# Patient Record
Sex: FEMALE | Race: WHITE
[De-identification: names, ages, dates, MRNs, and addresses within clinical notes are randomized per-mention and may not be internally consistent; named-entity substitution may affect disease eponyms.]

---

## 2020-01-22 ENCOUNTER — HOSPITAL ENCOUNTER (EMERGENCY)
Dept: HOSPITAL 41 - JD.ED | Age: 55
Discharge: HOME | End: 2020-01-22
Payer: COMMERCIAL

## 2020-01-22 DIAGNOSIS — Z88.0: ICD-10-CM

## 2020-01-22 DIAGNOSIS — M54.41: Primary | ICD-10-CM

## 2020-01-22 DIAGNOSIS — H81.10: ICD-10-CM

## 2020-01-22 PROCEDURE — 85025 COMPLETE CBC W/AUTO DIFF WBC: CPT

## 2020-01-22 PROCEDURE — 84443 ASSAY THYROID STIM HORMONE: CPT

## 2020-01-22 PROCEDURE — 99284 EMERGENCY DEPT VISIT MOD MDM: CPT

## 2020-01-22 PROCEDURE — 36415 COLL VENOUS BLD VENIPUNCTURE: CPT

## 2020-01-22 PROCEDURE — 80053 COMPREHEN METABOLIC PANEL: CPT

## 2020-01-22 PROCEDURE — 81001 URINALYSIS AUTO W/SCOPE: CPT

## 2020-01-22 PROCEDURE — 83735 ASSAY OF MAGNESIUM: CPT

## 2020-01-22 PROCEDURE — 96375 TX/PRO/DX INJ NEW DRUG ADDON: CPT

## 2020-01-22 PROCEDURE — 96374 THER/PROPH/DIAG INJ IV PUSH: CPT

## 2020-01-22 NOTE — EDM.PDOC
<Maye Pena - Last Filed: 01/22/20 09:23>





ED HPI GENERAL MEDICAL PROBLEM





- General


Chief Complaint: Neuro Symptoms/Deficits


Stated Complaint: BACK PAIN/ABD PAIN


Time Seen by Provider: 01/22/20 08:39


Source of Information: Reports: Patient, Family


History Limitations: Reports: No Limitations





- History of Present Illness


INITIAL COMMENTS - FREE TEXT/NARRATIVE: 


54-year-old female presents with dizziness and right sided sciatic pain. She 

states that the dizziness has been occurring several times a day since she 

moved to the area in September, however, it was the worst it has ever been last 

night and into this morning. She was only able to get 2 hours of sleep. She 

describes the dizziness as "room spinning." The patient is also experiencing 

tinnitus, but is unsure if it bilateral or one-sided. She denies hearing loss, 

but does have an extensive family history of hearing loss. The sciatic pain is 

chronic. She had a lumbar discectomy done in December a couple years back. 

After the surgery, the pain improved from going into her foot, to going into 

her knee. 





Duration: Chronic, Intermittent


Location: Reports: Head, Back


Quality: Reports: Ache (down the back of the right leg to the knee )


Severity: Moderate


Improves with: Reports: Other (movement helps the back pain)


Worsens with: Reports: Other (standing still worsens the back pain, patient is 

unsure if movement worsens dizziness)


Associated Symptoms: Reports: Other (tinnitus, dizziness, sciatic pain (right 

side)).  Denies: Headaches, Nausea/Vomiting, Syncope, Weakness


  ** Left Lower Back


Pain Score (Numeric/FACES): 6





- Related Data


 Allergies











Allergy/AdvReac Type Severity Reaction Status Date / Time


 


Penicillins Allergy  Hives Verified 01/22/20 08:32











Home Meds: 


 Home Meds





Meclizine [Antivert] 25 mg PO TID #21 tab.chew 01/22/20 [Rx]


buPROPion [Wellbutrin] 150 mg PO DAILY 01/22/20 [History]











Past Medical History


Musculoskeletal History: Reports: Other (See Below)


Other Musculoskeletal History: chronic sciatica


Neurological History: Reports: Vertigo





Social & Family History





- Tobacco Use


Smoking Status *Q: Never Smoker





ED ROS GENERAL





- Review of Systems


Review Of Systems: See Below


Constitutional: Reports: No Symptoms


HEENT: Reports: Vertigo.  Denies: Ear Pain, Hearing Loss, Vision Change


Respiratory: Reports: No Symptoms


Cardiovascular: Reports: No Symptoms


Endocrine: Reports: No Symptoms


GI/Abdominal: Reports: No Symptoms


: Reports: No Symptoms


Musculoskeletal: Reports: Back Pain (lumbar region), Leg Pain (right sciatic 

pain into the back of the knee)


Skin: Reports: No Symptoms


Neurological: Reports: Dizziness, Difficulty Walking (due to sciatic pain and 

dizziness).  Denies: Headache, Numbness, Tingling, Change in Speech


Psychiatric: Reports: No Symptoms


Hematologic/Lymphatic: Reports: No Symptoms





ED EXAM, NEURO





- Physical Exam


Exam: See Below


Exam Limited By: No Limitations


General Appearance: Alert, WD/WN, Mild Distress


Eye Exam: Bilateral Eye: EOMI, Normal Inspection, PERRL


Ears: Normal External Exam, Normal Canal, Hearing Grossly Normal, Normal TMs, 

Other (right side difficult to see due to cerumen )


Head Exam: Atraumatic, Normocephalic


Respiratory/Chest: No Respiratory Distress, Lungs Clear, Normal Breath Sounds, 

No Accessory Muscle Use


Cardiovascular: Normal Peripheral Pulses, Regular Rate, Rhythm, No Murmur


Neurological: Alert, Normal Mood/Affect, CN II-XII Intact, No Motor/Sensory 

Deficits, Straight Leg Raise (R) (mild pain from the back into the knee around 

50 degrees ).  No: Abnormal Finger to Nose, Abnormal Sensation, Straight Leg 

Raise (L)


Psychiatric: Normal Affect, Normal Mood


Skin Exam: Warm, Dry, Intact, Normal Color, No Rash





Course





- Vital Signs


Last Recorded V/S: 


 Last Vital Signs











Temp  98.7 F   01/22/20 08:23


 


Pulse  60   01/22/20 08:23


 


Resp  16   01/22/20 08:23


 


BP  100/73   01/22/20 08:23


 


Pulse Ox  98   01/22/20 08:23














- Orders/Labs/Meds


Orders: 


 Active Orders 24 hr











 Category Date Time Status


 


 Peripheral IV Care [RC] .AS DIRECTED Care  01/22/20 08:50 Ordered


 


 Sodium Chloride 0.9% [Saline Flush] Med  01/22/20 08:49 Ordered





 10 ml FLUSH ASDIRECTED PRN   


 


 Peripheral IV Insertion Adult [OM.PC] Routine Oth  01/22/20 08:49 Ordered








 Medication Orders





Sodium Chloride (Saline Flush)  10 ml FLUSH ASDIRECTED PRN


   PRN Reason: Keep Vein Open


   Last Admin: 01/22/20 09:39  Dose: 10 ml








Labs: 


 Laboratory Tests











  01/22/20 01/22/20 01/22/20 Range/Units





  08:18 09:20 09:20 


 


WBC   9.75   (3.98-10.04)  K/mm3


 


RBC   4.56   (3.98-5.22)  M/mm3


 


Hgb   14.3   (11.2-15.7)  gm/dl


 


Hct   42.9   (34.1-44.9)  %


 


MCV   94.1   (79.4-94.8)  fl


 


MCH   31.4   (25.6-32.2)  pg


 


MCHC   33.3   (32.2-35.5)  g/dl


 


RDW Std Deviation   42.2   (36.4-46.3)  fL


 


Plt Count   384 H   (182-369)  K/mm3


 


MPV   9.3 L   (9.4-12.3)  fl


 


Neut % (Auto)   64.9   (34.0-71.1)  %


 


Lymph % (Auto)   24.4   (19.3-51.7)  %


 


Mono % (Auto)   7.7   (4.7-12.5)  %


 


Eos % (Auto)   2.3   (0.7-5.8)  


 


Baso % (Auto)   0.4   (0.1-1.2)  %


 


Neut # (Auto)   6.33 H   (1.56-6.13)  K/mm3


 


Lymph # (Auto)   2.38   (1.18-3.74)  K/mm3


 


Mono # (Auto)   0.75 H   (0.24-0.36)  K/mm3


 


Eos # (Auto)   0.22   (0.04-0.36)  K/mm3


 


Baso # (Auto)   0.04   (0.01-0.08)  K/mm3


 


Sodium    138  (136-145)  mEq/L


 


Potassium    3.7  (3.5-5.1)  mEq/L


 


Chloride    102  ()  mEq/L


 


Carbon Dioxide    26  (21-32)  mEq/L


 


Anion Gap    13.7  (5-15)  


 


BUN    16  (7-18)  mg/dL


 


Creatinine    0.8  (0.55-1.02)  mg/dL


 


Est Cr Clr Drug Dosing    73.18  mL/min


 


Estimated GFR (MDRD)    > 60  (>60)  mL/min


 


BUN/Creatinine Ratio    20.0 H  (14-18)  


 


Glucose    89  ()  mg/dL


 


Calcium    8.9  (8.5-10.1)  mg/dL


 


Magnesium    2.1  (1.8-2.4)  mg/dl


 


Total Bilirubin    0.4  (0.2-1.0)  mg/dL


 


AST    21  (15-37)  U/L


 


ALT    32  (14-59)  U/L


 


Alkaline Phosphatase    82  ()  U/L


 


Total Protein    7.4  (6.4-8.2)  g/dl


 


Albumin    3.5  (3.4-5.0)  g/dl


 


Globulin    3.9  gm/dL


 


Albumin/Globulin Ratio    0.9 L  (1-2)  


 


TSH 3rd Generation    3.993 H  (0.358-3.74)  uIU/mL


 


Urine Color  Yellow    (Yellow)  


 


Urine Appearance  Clear    (Clear)  


 


Urine pH  7.0    (5.0-8.0)  


 


Ur Specific Gravity  1.015    (1.005-1.030)  


 


Urine Protein  Negative    (Negative)  


 


Urine Glucose (UA)  Negative    (Negative)  


 


Urine Ketones  Negative    (Negative)  


 


Urine Occult Blood  Negative    (Negative)  


 


Urine Nitrite  Negative    (Negative)  


 


Urine Bilirubin  Negative    (Negative)  


 


Urine Urobilinogen  0.2    (0.2-1.0)  


 


Ur Leukocyte Esterase  Negative    (Negative)  


 


Urine RBC  Not seen    (0-5)  /hpf


 


Urine WBC  0-5    (0-5)  /hpf


 


Ur Epithelial Cells  0-5    (0-5)  /hpf


 


Urine Bacteria  Not seen    (FEW)  /hpf


 


Urine Mucus  Not seen    (FEW)  /hpf











Meds: 


Medications











Generic Name Dose Route Start Last Admin





  Trade Name Althea  PRN Reason Stop Dose Admin


 


Sodium Chloride  10 ml  01/22/20 08:49  01/22/20 09:39





  Saline Flush  FLUSH   10 ml





  ASDIRECTED PRN   Administration





  Keep Vein Open   





     





     





     














Discontinued Medications














Generic Name Dose Route Start Last Admin





  Trade Name Freverónica  PRN Reason Stop Dose Admin


 


Ketorolac Tromethamine  30 mg  01/22/20 08:51  01/22/20 09:37





  Toradol  IVPUSH  01/22/20 08:52  30 mg





  ONETIME ONE   Administration





     





     





     





     


 


Lorazepam  1 mg  01/22/20 08:50  01/22/20 09:38





  Ativan  IVPUSH  01/22/20 08:51  1 mg





  ONETIME ONE   Administration





     





     





     





     


 


Meclizine HCl  25 mg  01/22/20 08:49  





  Antivert  PO  01/22/20 08:50  





  ONETIME ONE   





     





     





     





     


 


Metoclopramide HCl  10 mg  01/22/20 08:50  01/22/20 09:36





  Reglan  IVPUSH  01/22/20 08:51  10 mg





  ONETIME ONE   Administration





     





     





     





     














Departure





- Departure


Disposition: Home, Self-Care 01


Clinical Impression: 


 Right sided sciatica





BPPV (benign paroxysmal positional vertigo)


Qualifiers:


 Laterality: unspecified laterality Qualified Code(s): H81.10 - Benign 

paroxysmal vertigo, unspecified ear








- Discharge Information


Instructions:  Vertigo, Easy-to-Read


Referrals: 


PCP,None [Primary Care Provider] - 


Forms:  ED Department Discharge


Additional Instructions: 


You were evaluated in the ER today regarding your dizziness and back pain.





Your laboratory evaluation demonstrated no acute abnormalities, however your 

TSH was mildly elevated at 3.993.  Our high limit of normal is 3.74, so this is 

only very mildly elevated.  Recommend you have this lab redrawn in a couple 

weeks to see if it does not go down before treatment for hypothyroidism should 

be considered.





You were given some IV medications to help your dizziness, this did seem to 

help provide you pretty good relief, the Toradol also help provide you from 

your neck pain.





You were given a prescription for meclizine, please take 25 mg 3 times a day 

for further vertigo relief.





Highly recommend you set up care with a primary care provider, our [facility] number [phone], any family practice provider should be able 

to provide you with the services.  Please call as soon as you are able to 

obtain an appointment at the next available time for management.





You may also want to consider looking throughout the area for chiropractors 

that provide vertigo services, or discuss a PT referral with your primary care 

provider that you set up with for back pain relief and vertigo relief.





Please return to the ER at any time however if symptoms change or worsen.





Sepsis Event Note





- Evaluation


Sepsis Screening Result: No Definite Risk





- Focused Exam


Vital Signs: 


 Vital Signs











  Temp Pulse Resp BP Pulse Ox


 


 01/22/20 08:23  98.7 F  60  16  100/73  98











Date Exam was Performed: 01/22/20


Time Exam was Performed: 09:23





- My Orders


Last 24 Hours: 


My Active Orders





01/22/20 08:49


Sodium Chloride 0.9% [Saline Flush]   10 ml FLUSH ASDIRECTED PRN 


Peripheral IV Insertion Adult [OM.PC] Routine 





01/22/20 08:50


Peripheral IV Care [RC] .AS DIRECTED 














- Assessment/Plan


Last 24 Hours: 


My Active Orders





01/22/20 08:49


Sodium Chloride 0.9% [Saline Flush]   10 ml FLUSH ASDIRECTED PRN 


Peripheral IV Insertion Adult [OM.PC] Routine 





01/22/20 08:50


Peripheral IV Care [RC] .AS DIRECTED 














<St. CharlesPoonam - Last Filed: 01/22/20 10:43>





ED HPI GENERAL MEDICAL PROBLEM





- History of Present Illness


INITIAL COMMENTS - FREE TEXT/NARRATIVE: 





I have read and reviewed the student's HPI and examined the patient and agree 

with ZOHAIB Moody-student.





Course





- Re-Assessments/Exams


Free Text/Narrative Re-Assessment/Exam: 





01/22/20 09:21


Patient presents to the ED for evaluation of acute on chronic dizziness, and 

chronic sciatic pain.  As the patient is recently moved here within the last 5 

months, I will suggest that she obtain a primary care provider for her chronic 

issues, however I do believe that her dizziness is due to BPPV at this time, 

she will be treated as such and will also have some labs done to rule out any 

other acute abnormalities.  She will be given some Toradol, Reglan, and Ativan 

for combination of her back pain and dizziness.  We will likely try to get her 

in with PT referral for her back and dizziness as well.





01/22/20 10:35


Patient's labs are back, and demonstrate no acute abnormalities, her TSH is 

mildly elevated at 3.993.  I did explain to them that this is very mildly 

elevated, and that she should have this level redrawn within a couple weeks to 

see if it does not go down before treatment for hypothyroidism should be 

considered.  They seem to be understanding of this.  I did reassess the patient

, she states that her dizziness has improved with the medications given.  So I 

will send her some meclizine, and have her follow-up with a regular care 

provider for further management.





Departure





- Departure


Time of Disposition: 10:37


Condition: Fair





- Discharge Information


*PRESCRIPTION DRUG MONITORING PROGRAM REVIEWED*: No


*COPY OF PRESCRIPTION DRUG MONITORING REPORT IN PATIENT ROHAN: No





Sepsis Event Note





- Focused Exam


Date Exam was Performed: 01/22/20


Time Exam was Performed: 10:34